# Patient Record
Sex: FEMALE | Race: WHITE | NOT HISPANIC OR LATINO | Employment: UNEMPLOYED | URBAN - METROPOLITAN AREA
[De-identification: names, ages, dates, MRNs, and addresses within clinical notes are randomized per-mention and may not be internally consistent; named-entity substitution may affect disease eponyms.]

---

## 2022-08-30 ENCOUNTER — TELEPHONE (OUTPATIENT)
Dept: PSYCHOLOGY | Facility: CLINIC | Age: 62
End: 2022-08-30

## 2022-08-30 NOTE — TELEPHONE ENCOUNTER
"----- Message from Madhu Buchanan sent at 8/30/2022 12:12 PM CDT -----  Consult/Advisory:        Name Of Caller: Self      Contact Preference?: [phone]       What is the nature of the call?: Calling to speak w/ nurse. Inquiring if  is a prescribing psychologist and insurance inquiries          Additional Notes:  "Thank you for all that you do for our patients"      "

## 2024-12-10 ENCOUNTER — HOSPITAL ENCOUNTER (OUTPATIENT)
Dept: RADIOLOGY | Facility: OTHER | Age: 64
Discharge: HOME | End: 2024-12-10
Attending: FAMILY MEDICINE
Payer: COMMERCIAL

## 2024-12-10 ENCOUNTER — OFFICE VISIT (OUTPATIENT)
Dept: URGENT CARE | Facility: CLINIC | Age: 64
End: 2024-12-10
Payer: COMMERCIAL

## 2024-12-10 VITALS
TEMPERATURE: 98 F | SYSTOLIC BLOOD PRESSURE: 135 MMHG | HEART RATE: 83 BPM | RESPIRATION RATE: 18 BRPM | OXYGEN SATURATION: 96 % | HEIGHT: 64 IN | DIASTOLIC BLOOD PRESSURE: 82 MMHG | BODY MASS INDEX: 32.39 KG/M2

## 2024-12-10 DIAGNOSIS — S06.0X9A CONCUSSION WITH LOSS OF CONSCIOUSNESS, INITIAL ENCOUNTER: ICD-10-CM

## 2024-12-10 DIAGNOSIS — S09.90XA TRAUMATIC INJURY OF HEAD, INITIAL ENCOUNTER: ICD-10-CM

## 2024-12-10 DIAGNOSIS — S09.90XA TRAUMATIC INJURY OF HEAD, INITIAL ENCOUNTER: Primary | ICD-10-CM

## 2024-12-10 PROCEDURE — 70450 CT HEAD/BRAIN W/O DYE: CPT | Mod: TC

## 2024-12-10 PROCEDURE — 70450 CT HEAD/BRAIN W/O DYE: CPT | Mod: 26,,, | Performed by: RADIOLOGY

## 2024-12-10 PROCEDURE — 99213 OFFICE O/P EST LOW 20 MIN: CPT | Mod: S$GLB,,, | Performed by: FAMILY MEDICINE

## 2024-12-10 RX ORDER — BENZONATATE 200 MG/1
200 CAPSULE ORAL
COMMUNITY
Start: 2024-07-15

## 2024-12-10 RX ORDER — IBUPROFEN 600 MG/1
600 TABLET ORAL EVERY 8 HOURS PRN
Qty: 30 TABLET | Refills: 0 | Status: SHIPPED | OUTPATIENT
Start: 2024-12-10

## 2024-12-10 RX ORDER — AZELASTINE 1 MG/ML
SPRAY, METERED NASAL
COMMUNITY

## 2024-12-10 RX ORDER — OMEGA-3-ACID ETHYL ESTERS 1 G/1
1 CAPSULE, LIQUID FILLED ORAL 2 TIMES DAILY
COMMUNITY
Start: 2024-09-10

## 2024-12-10 RX ORDER — UBIDECARENONE 30 MG
1 CAPSULE ORAL DAILY
COMMUNITY

## 2024-12-10 NOTE — PROGRESS NOTES
"Subjective:      Patient ID: Dina Pop is a 64 y.o. female.    Vitals:  height is 5' 4" (1.626 m). Her oral temperature is 97.6 °F (36.4 °C). Her blood pressure is 135/82 and her pulse is 83. Her respiration is 18 and oxygen saturation is 96%.     Chief Complaint: Fall    Patient fell while walking and tripped 4 days ago. Patient fell forward to her knees and hit her head on cement. Patient felt like she went unconscious but not sure how long she was out. Her arms and knees begin to feel sore over the last couple days but her head injury is her concern. The bruising and swelling has gone down from her forehead over the last couple days. She also says the top of her head hurts also. Her head pain was a 9 but currently a 6. She is also concerned about her word stuttering after the head injury because that's what occurred after her previous head injury which was about 6 years ago.    Fall  The accident occurred 3 to 5 days ago. The fall occurred while walking. She landed on [place]. The point of impact was the head, left knee and right knee. The pain is present in the head, right knee, left knee, right shoulder and left shoulder. The pain is at a severity of 9/10. The pain is moderate. Associated symptoms include headaches. Pertinent negatives include no nausea, numbness or tingling. She has tried ice for the symptoms.       Gastrointestinal:  Negative for nausea.   Neurological:  Positive for headaches. Negative for numbness.      Objective:     Physical Exam   Constitutional: She is oriented to person, place, and time. She does not appear ill. No distress. normal  HENT:   Head: Normocephalic and atraumatic.   Neck: Neck supple.   Cardiovascular: Normal rate and regular rhythm.   Abdominal: Normal appearance.   Musculoskeletal:      Cervical back: She exhibits no tenderness.   Neurological: no focal deficit. She is alert, oriented to person, place, and time and at baseline. She displays no weakness and normal " reflexes. No cranial nerve deficit or sensory deficit. Coordination and gait normal.   Psychiatric: Her behavior is normal. Mood normal.   Nursing note and vitals reviewed.      Assessment:     1. Traumatic injury of head, initial encounter    2. Concussion with loss of consciousness, initial encounter        Plan:       Traumatic injury of head, initial encounter  -     CT Head Without Contrast; Future; Expected date: 12/10/2024  -     Ambulatory referral/consult to Neurology    Concussion with loss of consciousness, initial encounter  -     Ambulatory referral/consult to Neurology

## 2024-12-11 ENCOUNTER — TELEPHONE (OUTPATIENT)
Dept: URGENT CARE | Facility: CLINIC | Age: 64
End: 2024-12-11
Payer: COMMERCIAL

## 2024-12-11 NOTE — TELEPHONE ENCOUNTER
"CT Head Without Contrast shows "No acute abnormality."  Pt already referred to Neurology.  NP called pt and left VM with callback #.  Pt can view results online.  If questions or concerns call us back or f/u with neurology.  "